# Patient Record
Sex: FEMALE | Race: WHITE | NOT HISPANIC OR LATINO | Employment: FULL TIME | URBAN - METROPOLITAN AREA
[De-identification: names, ages, dates, MRNs, and addresses within clinical notes are randomized per-mention and may not be internally consistent; named-entity substitution may affect disease eponyms.]

---

## 2019-08-29 ENCOUNTER — OFFICE VISIT (OUTPATIENT)
Dept: GASTROENTEROLOGY | Facility: CLINIC | Age: 44
End: 2019-08-29
Payer: MEDICAID

## 2019-08-29 VITALS
DIASTOLIC BLOOD PRESSURE: 84 MMHG | BODY MASS INDEX: 35.03 KG/M2 | SYSTOLIC BLOOD PRESSURE: 122 MMHG | HEART RATE: 63 BPM | WEIGHT: 254.69 LBS

## 2019-08-29 DIAGNOSIS — R63.4 WEIGHT LOSS: ICD-10-CM

## 2019-08-29 DIAGNOSIS — K59.04 CHRONIC IDIOPATHIC CONSTIPATION: ICD-10-CM

## 2019-08-29 DIAGNOSIS — R11.2 NAUSEA AND VOMITING, INTRACTABILITY OF VOMITING NOT SPECIFIED, UNSPECIFIED VOMITING TYPE: Primary | ICD-10-CM

## 2019-08-29 PROCEDURE — 99203 OFFICE O/P NEW LOW 30 MIN: CPT | Mod: PBBFAC,PO | Performed by: INTERNAL MEDICINE

## 2019-08-29 PROCEDURE — 99999 PR PBB SHADOW E&M-NEW PATIENT-LVL III: ICD-10-PCS | Mod: PBBFAC,,, | Performed by: INTERNAL MEDICINE

## 2019-08-29 PROCEDURE — 99204 PR OFFICE/OUTPT VISIT, NEW, LEVL IV, 45-59 MIN: ICD-10-PCS | Mod: S$PBB,,, | Performed by: INTERNAL MEDICINE

## 2019-08-29 PROCEDURE — 99999 PR PBB SHADOW E&M-NEW PATIENT-LVL III: CPT | Mod: PBBFAC,,, | Performed by: INTERNAL MEDICINE

## 2019-08-29 PROCEDURE — 99204 OFFICE O/P NEW MOD 45 MIN: CPT | Mod: S$PBB,,, | Performed by: INTERNAL MEDICINE

## 2019-08-29 RX ORDER — LISINOPRIL AND HYDROCHLOROTHIAZIDE 20; 25 MG/1; MG/1
TABLET ORAL DAILY
COMMUNITY
Start: 2019-08-28 | End: 2019-09-30

## 2019-08-29 RX ORDER — ERGOCALCIFEROL 1.25 MG/1
CAPSULE ORAL
Refills: 0 | COMMUNITY
Start: 2019-07-15 | End: 2019-09-30

## 2019-08-29 RX ORDER — ONDANSETRON 4 MG/1
4 TABLET, ORALLY DISINTEGRATING ORAL EVERY 6 HOURS PRN
Qty: 60 TABLET | Refills: 5 | Status: SHIPPED | OUTPATIENT
Start: 2019-08-29 | End: 2020-08-25

## 2019-08-29 RX ORDER — POLYETHYLENE GLYCOL 3350 17 G/17G
17 POWDER, FOR SOLUTION ORAL DAILY
Qty: 1 BOTTLE | Refills: 11 | Status: ON HOLD | OUTPATIENT
Start: 2019-08-29 | End: 2019-09-17 | Stop reason: HOSPADM

## 2019-08-29 NOTE — PROGRESS NOTES
Subjective:       Patient ID: Ruby Russell is a 44 y.o. female.    Chief Complaint: Nausea; Bloated; and Emesis    45 yo F complains of n/v.  She is a recovering heroin addict clean x 2 years, on Methadone.  She was on Methadone for a full year before she began to have symptoms.  The nausea is daily and is very severe.  The vomiting is intermittent.  She has infrequent rock-like stools maximum of 3 times per week.  The vomiting is usually episodic:  When it happens, it is repetitive, severe, and lasts for at least 5 days before finally resolving.  Zofran helps somewhat, but worsens her constipation.  She took Miralax daily for one week and finally had a large amount of stool output, but did not continue its use.  Her renal function has declined recently as well, unknown etiology at this time.  Chart review shows 65# weight loss in the past 15 months.    Review of Systems   Constitutional: Negative for chills and fever.   Eyes: Negative for photophobia and visual disturbance.   Respiratory: Negative for chest tightness, shortness of breath and wheezing.    Cardiovascular: Negative for chest pain, palpitations and leg swelling.   Genitourinary: Negative for dysuria, flank pain and hematuria.   Musculoskeletal: Negative for joint swelling and myalgias.   Skin: Negative for color change and rash.   Neurological: Negative for dizziness and speech difficulty.   Psychiatric/Behavioral: Negative for confusion and hallucinations.       Objective:       /84 (BP Location: Left arm, Patient Position: Sitting, BP Method: X-Large (Manual))   Pulse 63   Wt 115.5 kg (254 lb 11.2 oz)   BMI 35.03 kg/m²     Physical Exam   Constitutional: She is oriented to person, place, and time. She appears well-developed and well-nourished.   Eyes: Pupils are equal, round, and reactive to light. EOM are normal.   Neck: Normal range of motion. Neck supple.   Cardiovascular: Normal rate and regular rhythm.   Pulmonary/Chest: Effort  normal and breath sounds normal.   Abdominal: Soft. Bowel sounds are normal. She exhibits no distension and no mass. There is no tenderness. There is no rebound and no guarding.   obese   Musculoskeletal: Normal range of motion. She exhibits no edema.   Neurological: She is alert and oriented to person, place, and time.   Psychiatric: She has a normal mood and affect. Her behavior is normal. Judgment and thought content normal.       CMP  Sodium   Date Value Ref Range Status   06/07/2019 140 136 - 145 mmol/L Final     Potassium   Date Value Ref Range Status   06/07/2019 3.6 3.5 - 5.1 mmol/L Final     Chloride   Date Value Ref Range Status   06/07/2019 99 95 - 110 mmol/L Final     CO2   Date Value Ref Range Status   06/07/2019 31 (H) 23 - 29 mmol/L Final     Glucose   Date Value Ref Range Status   06/07/2019 79 70 - 110 mg/dL Final     BUN, Bld   Date Value Ref Range Status   06/07/2019 14 7 - 17 mg/dL Final     Creatinine   Date Value Ref Range Status   06/07/2019 2.28 (H) 0.50 - 1.40 mg/dL Final     Calcium   Date Value Ref Range Status   06/07/2019 10.0 8.7 - 10.5 mg/dL Final     Total Protein   Date Value Ref Range Status   06/07/2019 8.2 6.0 - 8.4 g/dL Final     Albumin   Date Value Ref Range Status   06/07/2019 4.4 3.5 - 5.2 g/dL Final     Total Bilirubin   Date Value Ref Range Status   06/07/2019 0.7 0.1 - 1.0 mg/dL Final     Comment:     For infants and newborns, interpretation of results should be based  on gestational age, weight and in agreement with clinical  observations.  Premature Infant recommended reference ranges:  Up to 24 hours.............<8.0 mg/dL  Up to 48 hours............<12.0 mg/dL  3-5 days..................<15.0 mg/dL  6-29 days.................<15.0 mg/dL       Alkaline Phosphatase   Date Value Ref Range Status   06/07/2019 180 (H) 38 - 126 U/L Final     AST   Date Value Ref Range Status   06/07/2019 44 15 - 46 U/L Final     ALT   Date Value Ref Range Status   06/07/2019 38 10 - 44 U/L  Final     Anion Gap   Date Value Ref Range Status   06/07/2019 10 8 - 16 mmol/L Final     eGFR if    Date Value Ref Range Status   06/07/2019 29.6 (A) >60 mL/min/1.73 m^2 Final     eGFR if non    Date Value Ref Range Status   06/07/2019 25.7 (A) >60 mL/min/1.73 m^2 Final     Comment:     Calculation used to obtain the estimated glomerular filtration  rate (eGFR) is the CKD-EPI equation.        Chart review shows previously elevated LFTs, which are now normal.  KUB was independently visualized and reviewed by me and showed rock-like stool throughout the colon.    Assessment:       1. Nausea and vomiting, intractability of vomiting not specified, unspecified vomiting type    2. Chronic idiopathic constipation    3. Weight loss        Plan:       Nausea and vomiting, intractability of vomiting not specified, unspecified vomiting type  -     Case request GI: EGD (ESOPHAGOGASTRODUODENOSCOPY)  -     ondansetron (ZOFRAN-ODT) 4 MG TbDL; Take 1 tablet (4 mg total) by mouth every 6 (six) hours as needed.  Dispense: 60 tablet; Refill: 5  -     This is likely multifactorial, especially given recent worsening of renal function.  EGD with r/u GOO.  Will consider GES in future if n/v not improved with resolution of constipation.    Chronic idiopathic constipation        -     polyethylene glycol (GLYCOLAX) 17 gram/dose powder; Take 17 g by mouth once daily.  Dispense: 1 Bottle; Refill: 11        -     Constipation can definitely be the source for n/v        -     Will plan for Movantik if Miralax is not successful    Weight loss        -     Monitor for now if EGD unrevealing.

## 2019-08-29 NOTE — PATIENT INSTRUCTIONS
EGD Prep Instructions    [facility] [address] [place]  [address]    You are scheduled for an EGD with Dr. Leong on Tuesday, September 17 at [facility].  You will check in at Admit on the first floor of the hospital.    Nothing to eat or drink after midnight before the procedure.  You MAY brush your teeth.    You MAY take your blood pressure, heart, and seizure medication on the morning of the procedure, with a SIP of water.  Hold ALL other medications until after the procedure.    If you are on blood thinners THAT YOU HAVE BEEN INSTRUCTED TO HOLD BY YOUR DOCTOR FOR THIS PROCEDURE, then do NOT take this the morning of your EGD.  Do NOT stop these medications on your own, they must be approved to be held by your doctor.  Your EGD can NOT be done if you are on these medications.  Examples of blood thinners include: Coumadin, Aggrenox, Plavix, Pradaxa, Reapro, Pletal, Xarelto, Ticagrelor, Brilinta, Eliquis, and high dose aspirin (325 mg).  You do not have to stop baby aspirin 81 mg.    You will receive a call a few days before your EGD to tell you the time to arrive.  If you have not received a call by the day before your procedure, call the Pre-op Coordinator at [phone].

## 2019-09-26 ENCOUNTER — TELEPHONE (OUTPATIENT)
Dept: GASTROENTEROLOGY | Facility: CLINIC | Age: 44
End: 2019-09-26

## 2019-09-26 NOTE — TELEPHONE ENCOUNTER
----- Message from Constance Moore sent at 9/26/2019 11:43 AM CDT -----  Contact: [phone]/self  Type:  Patient Returning Call    Who Called: self  Who Left Message for Patient: Trinh Winslow MA    Does the patient know what this is regarding?: test resykts  Would the patient rather a call back or a response via [facility]?  call  Best Call Back Number:   Additional Information:  It is ok to leave a message with results on her phone

## 2019-09-26 NOTE — TELEPHONE ENCOUNTER
----- Message from Stephanie Leong MD sent at 9/24/2019 10:34 AM CDT -----  HP negative and esophageal biopsies normal.  I sent Movantik Rx for her, please make sure she has gotten the med.  And please give n/a appt with me.

## 2019-10-23 ENCOUNTER — TELEPHONE (OUTPATIENT)
Dept: GASTROENTEROLOGY | Facility: CLINIC | Age: 44
End: 2019-10-23

## 2019-10-23 NOTE — LETTER
October 23, 2019      [facility]  [address], [address]   [place] [address]  Phone: [phone]  Fax: [phone]       Patient: Ruby Russell   YOB: 1975  Date of Visit: 10/23/2019    To Whom It May Concern:    Amari Russell  was at [facility] on 10/23/2019. She may return to work/school on 10/24/19 with no restrictions. If you have any questions or concerns, or if I can be of further assistance, please do not hesitate to contact me.    Sincerely,    Trinh Winslow MA

## 2020-01-21 PROBLEM — E55.9 VITAMIN D DEFICIENCY: Status: ACTIVE | Noted: 2020-01-21

## 2020-01-21 PROBLEM — E83.39 HYPERPHOSPHATEMIA: Status: ACTIVE | Noted: 2020-01-21

## 2020-01-21 PROBLEM — N18.30 CHRONIC KIDNEY DISEASE, STAGE III (MODERATE): Status: ACTIVE | Noted: 2020-01-21

## 2020-01-21 PROBLEM — R80.9 MICROALBUMINURIA: Status: ACTIVE | Noted: 2020-01-21

## 2020-01-21 PROBLEM — E79.0 HYPERURICEMIA: Status: ACTIVE | Noted: 2020-01-21
